# Patient Record
Sex: MALE | Race: WHITE | HISPANIC OR LATINO | URBAN - METROPOLITAN AREA
[De-identification: names, ages, dates, MRNs, and addresses within clinical notes are randomized per-mention and may not be internally consistent; named-entity substitution may affect disease eponyms.]

---

## 2025-07-23 ENCOUNTER — APPOINTMENT (OUTPATIENT)
Dept: PRIMARY CARE | Facility: CLINIC | Age: 55
End: 2025-07-23
Payer: COMMERCIAL

## 2025-07-23 VITALS
SYSTOLIC BLOOD PRESSURE: 110 MMHG | WEIGHT: 153.8 LBS | HEART RATE: 54 BPM | HEIGHT: 66 IN | RESPIRATION RATE: 13 BRPM | DIASTOLIC BLOOD PRESSURE: 68 MMHG | TEMPERATURE: 97.3 F | OXYGEN SATURATION: 96 % | BODY MASS INDEX: 24.72 KG/M2

## 2025-07-23 DIAGNOSIS — Z13.220 SCREENING CHOLESTEROL LEVEL: ICD-10-CM

## 2025-07-23 DIAGNOSIS — Z13.29 THYROID DISORDER SCREENING: ICD-10-CM

## 2025-07-23 DIAGNOSIS — Z12.5 PROSTATE CANCER SCREENING: ICD-10-CM

## 2025-07-23 DIAGNOSIS — K44.9 HIATAL HERNIA: Primary | ICD-10-CM

## 2025-07-23 DIAGNOSIS — Z00.00 HEALTH MAINTENANCE EXAMINATION: ICD-10-CM

## 2025-07-23 PROCEDURE — 99203 OFFICE O/P NEW LOW 30 MIN: CPT | Performed by: INTERNAL MEDICINE

## 2025-07-23 PROCEDURE — 1036F TOBACCO NON-USER: CPT | Performed by: INTERNAL MEDICINE

## 2025-07-23 PROCEDURE — 3008F BODY MASS INDEX DOCD: CPT | Performed by: INTERNAL MEDICINE

## 2025-07-23 ASSESSMENT — ENCOUNTER SYMPTOMS
LIGHT-HEADEDNESS: 0
PALPITATIONS: 0
CONFUSION: 0
COUGH: 0
TROUBLE SWALLOWING: 0
CHILLS: 0
DIARRHEA: 0
DIZZINESS: 0
VOMITING: 0
BLOOD IN STOOL: 0
CONSTIPATION: 0
SHORTNESS OF BREATH: 0
FEVER: 0
AGITATION: 0
NAUSEA: 0
DYSURIA: 0
SORE THROAT: 0
ABDOMINAL PAIN: 1

## 2025-07-23 ASSESSMENT — PAIN SCALES - GENERAL: PAINLEVEL_OUTOF10: 0-NO PAIN

## 2025-07-23 ASSESSMENT — PATIENT HEALTH QUESTIONNAIRE - PHQ9
SUM OF ALL RESPONSES TO PHQ9 QUESTIONS 1 AND 2: 0
2. FEELING DOWN, DEPRESSED OR HOPELESS: NOT AT ALL
1. LITTLE INTEREST OR PLEASURE IN DOING THINGS: NOT AT ALL

## 2025-07-23 NOTE — PROGRESS NOTES
Subjective   Patient ID: Pa Segovia is a 55 y.o. male who presents for Hiatal Hernia (He has tried Carafate, Bentyl, PPIs and antacids with ineffective results. Difficulties sleeping ), Gas, Bloated, and Abdominal Pain.  History of Present Illness  Pa Segovia is a 55 year old male with a hiatal hernia who presents with persistent symptoms despite treatment.    He has been experiencing persistent symptoms related to a hiatal hernia, diagnosed approximately three to four years ago after an EGD and colonoscopy. Despite various treatments, including high-dose Protonix, omeprazole, Carafate, Betadine, general antacids, dietary changes, and lifestyle modifications such as using a wedge pillow and avoiding spicy foods, his symptoms have not improved.  He has not had any unintentional weight loss.  No difficulty swallowing.  Does not have issues when he swallows with food getting stuck.    He experiences significant sleep disturbances due to his symptoms, waking up around 2:30 to 3:00 AM with a sensation of fullness and the need to belch for extended periods. He describes the sensation as feeling like 'there's a basketball in there just slowly emptying out.' His symptoms are primarily nocturnal, and he does not experience acid reflux or vomiting. He has also tried Nexium and pepcid without relief.     No chest pain, shortness of breath, vomiting, blood in stool, or burning with urination. He does not have a history of sleep apnea, although his mother has a history of it.    He works in retail and reports that the lack of sleep is impacting his daily life, causing exhaustion and affecting his work performance. He does not smoke or consume alcohol regularly, and he has no history of surgeries or significant medical problems aside from the hiatal hernia.    His family history includes his mother having COPD and diabetes, but no family history of gastrointestinal issues. He has been tested for celiac disease and  "gluten intolerance, which were negative. He has not had any recent blood work done, with the last tests being over a year ago.    Review of Systems   Constitutional:  Negative for chills and fever.   HENT:  Negative for sore throat and trouble swallowing.    Eyes:  Negative for visual disturbance.   Respiratory:  Negative for cough and shortness of breath.    Cardiovascular:  Negative for chest pain, palpitations and leg swelling.   Gastrointestinal:  Positive for abdominal pain. Negative for blood in stool, constipation, diarrhea, nausea and vomiting.   Genitourinary:  Negative for dysuria.   Skin:  Negative for rash.   Neurological:  Negative for dizziness, syncope and light-headedness.   Psychiatric/Behavioral:  Negative for agitation and confusion.        Objective     /68 (BP Location: Right arm, Patient Position: Sitting, BP Cuff Size: Adult)   Pulse 54   Temp 36.3 °C (97.3 °F) (Temporal)   Resp 13   Ht 1.67 m (5' 5.75\")   Wt 69.8 kg (153 lb 12.8 oz)   SpO2 96% Comment: RA  BMI 25.01 kg/m²      Physical Exam  Vitals and nursing note reviewed.   Constitutional:       General: He is not in acute distress.     Appearance: Normal appearance. He is not ill-appearing, toxic-appearing or diaphoretic.   HENT:      Head: Normocephalic and atraumatic.      Mouth/Throat:      Mouth: Mucous membranes are moist.      Pharynx: Oropharynx is clear. No oropharyngeal exudate.     Eyes:      Pupils: Pupils are equal, round, and reactive to light.       Cardiovascular:      Rate and Rhythm: Normal rate and regular rhythm.      Heart sounds: Normal heart sounds. No murmur heard.  Pulmonary:      Effort: Pulmonary effort is normal. No respiratory distress.      Breath sounds: Normal breath sounds. No wheezing, rhonchi or rales.   Abdominal:      General: There is no distension.      Palpations: Abdomen is soft. There is no mass.      Tenderness: There is abdominal tenderness (Mild tenderness to deep palpation in the " epigastric region). There is no guarding or rebound.      Hernia: No hernia is present.     Musculoskeletal:      Cervical back: Neck supple.      Right lower leg: No edema.      Left lower leg: No edema.   Lymphadenopathy:      Cervical: No cervical adenopathy.     Skin:     General: Skin is warm and dry.      Coloration: Skin is not jaundiced or pale.      Findings: No rash.     Neurological:      General: No focal deficit present.      Mental Status: He is alert and oriented to person, place, and time.      Cranial Nerves: No cranial nerve deficit.     Psychiatric:         Mood and Affect: Mood normal.         Behavior: Behavior normal.         Thought Content: Thought content normal.         Judgment: Judgment normal.        Physical Exam  CHEST: Lungs clear to auscultation bilaterally.  CARDIOVASCULAR: Heart sounds normal.  EXTREMITIES: No edema in extremities.    Assessment & Plan  Hiatal Hernia  Chronic hiatal hernia with persistent nocturnal belching and discomfort, leading to sleep disturbances. Previous trials of omeprazole, Protonix, Nexium, Pepcid, Mylanta Carafate, and dietary modifications have been ineffective. No acid reflux or vomiting. Hernia identified during EGD. Considering surgical intervention due to lack of relief from conservative measures. Discussed potential surgical options with Dr. Gutierrez, a thoracic surgeon specializing in laparoscopic repair of hiatal hernias.  - Refer to Dr. Gutierrez for evaluation and potential surgical intervention.  - Obtain previous EGD and colonoscopy reports from [facility] for Dr. Gutierrez's review.    General Health Maintenance  Vaccinations are up-to-date; declined shingles vaccine and pneumonia vaccine. Blood pressure is well-controlled. No hyperlipidemia or diabetes. No significant family history of gastrointestinal issues.  - Order general blood work, including PSA for prostate cancer screening.  - Ensure records from  previous healthcare providers are obtained for comprehensive care.    Follow-up  Ensure continuity of care and address ongoing symptoms related to hiatal hernia.  - Schedule follow-up appointment with Dr. Gutierrez for surgical consultation.  - Instruct him to complete blood work and follow up with results in a couple of weeks.  - Advise him to contact the office if there are issues with obtaining records or scheduling appointments.    Results  RADIOLOGY    Problem List Items Addressed This Visit       Hiatal hernia - Primary    Relevant Orders    Referral to Thoracic Surgery    Screening cholesterol level    Relevant Orders    Lipid panel    Thyroid disorder screening    Relevant Orders    TSH with reflex to Free T4 if abnormal    Prostate cancer screening    Relevant Orders    Prostate Spec.Ag,Screen    Health maintenance examination    Relevant Orders    CBC and Auto Differential    Comprehensive metabolic panel         Rishi Garza,      This medical note was created with the assistance of artificial intelligence (AI) for documentation purposes. The content has been reviewed and confirmed by the healthcare provider for accuracy and completeness. Patient consented to the use of audio recording and use of AI during their visit.

## 2025-08-27 ENCOUNTER — OFFICE VISIT (OUTPATIENT)
Dept: SURGERY | Facility: CLINIC | Age: 55
End: 2025-08-27
Payer: COMMERCIAL

## 2025-08-27 VITALS
TEMPERATURE: 99.3 F | OXYGEN SATURATION: 96 % | SYSTOLIC BLOOD PRESSURE: 98 MMHG | WEIGHT: 149.6 LBS | BODY MASS INDEX: 24.93 KG/M2 | HEIGHT: 65 IN | DIASTOLIC BLOOD PRESSURE: 61 MMHG | HEART RATE: 68 BPM

## 2025-08-27 DIAGNOSIS — K44.9 HIATAL HERNIA: Primary | ICD-10-CM

## 2025-08-27 PROCEDURE — 1036F TOBACCO NON-USER: CPT | Performed by: THORACIC SURGERY (CARDIOTHORACIC VASCULAR SURGERY)

## 2025-08-27 PROCEDURE — 99204 OFFICE O/P NEW MOD 45 MIN: CPT | Performed by: THORACIC SURGERY (CARDIOTHORACIC VASCULAR SURGERY)

## 2025-08-27 PROCEDURE — 3008F BODY MASS INDEX DOCD: CPT | Performed by: THORACIC SURGERY (CARDIOTHORACIC VASCULAR SURGERY)

## 2025-08-27 PROCEDURE — 99214 OFFICE O/P EST MOD 30 MIN: CPT | Performed by: THORACIC SURGERY (CARDIOTHORACIC VASCULAR SURGERY)

## 2025-08-27 ASSESSMENT — LIFESTYLE VARIABLES
SKIP TO QUESTIONS 9-10: 1
HOW MANY STANDARD DRINKS CONTAINING ALCOHOL DO YOU HAVE ON A TYPICAL DAY: PATIENT DOES NOT DRINK
HOW OFTEN DO YOU HAVE SIX OR MORE DRINKS ON ONE OCCASION: NEVER
AUDIT-C TOTAL SCORE: 0
HOW OFTEN DO YOU HAVE A DRINK CONTAINING ALCOHOL: NEVER

## 2025-08-27 ASSESSMENT — ENCOUNTER SYMPTOMS
OCCASIONAL FEELINGS OF UNSTEADINESS: 0
DEPRESSION: 0
LOSS OF SENSATION IN FEET: 0

## 2025-08-27 ASSESSMENT — PAIN SCALES - GENERAL: PAINLEVEL_OUTOF10: 0-NO PAIN

## 2025-08-28 DIAGNOSIS — K44.9 HIATAL HERNIA: ICD-10-CM

## 2025-09-03 ENCOUNTER — APPOINTMENT (OUTPATIENT)
Dept: RADIOLOGY | Facility: CLINIC | Age: 55
End: 2025-09-03
Payer: COMMERCIAL

## 2025-09-09 ENCOUNTER — APPOINTMENT (OUTPATIENT)
Dept: RADIOLOGY | Facility: CLINIC | Age: 55
End: 2025-09-09
Payer: COMMERCIAL